# Patient Record
Sex: FEMALE | Race: OTHER | Employment: FULL TIME | URBAN - METROPOLITAN AREA
[De-identification: names, ages, dates, MRNs, and addresses within clinical notes are randomized per-mention and may not be internally consistent; named-entity substitution may affect disease eponyms.]

---

## 2019-05-27 ENCOUNTER — APPOINTMENT (OUTPATIENT)
Dept: ULTRASOUND IMAGING | Age: 29
End: 2019-05-27
Attending: PHYSICIAN ASSISTANT
Payer: SELF-PAY

## 2019-05-27 ENCOUNTER — HOSPITAL ENCOUNTER (EMERGENCY)
Age: 29
Discharge: HOME | End: 2019-05-27
Attending: EMERGENCY MEDICINE
Payer: SELF-PAY

## 2019-05-27 VITALS
DIASTOLIC BLOOD PRESSURE: 86 MMHG | HEART RATE: 73 BPM | TEMPERATURE: 97 F | OXYGEN SATURATION: 99 % | SYSTOLIC BLOOD PRESSURE: 125 MMHG | RESPIRATION RATE: 14 BRPM

## 2019-05-27 DIAGNOSIS — R10.2 PELVIC PAIN: Primary | ICD-10-CM

## 2019-05-27 DIAGNOSIS — N83.201 RIGHT OVARIAN CYST: ICD-10-CM

## 2019-05-27 LAB
ALBUMIN SERPL-MCNC: 3.8 G/DL (ref 3.4–5)
ALBUMIN/GLOB SERPL: 1 {RATIO} (ref 0.8–1.7)
ALP SERPL-CCNC: 85 U/L (ref 45–117)
ALT SERPL-CCNC: 29 U/L (ref 13–56)
ANION GAP SERPL CALC-SCNC: 9 MMOL/L (ref 3–18)
APPEARANCE UR: CLEAR
AST SERPL-CCNC: 20 U/L (ref 15–37)
BACTERIA URNS QL MICRO: NEGATIVE /HPF
BASOPHILS # BLD: 0 K/UL (ref 0–0.1)
BASOPHILS NFR BLD: 0 % (ref 0–2)
BILIRUB SERPL-MCNC: 0.3 MG/DL (ref 0.2–1)
BILIRUB UR QL: NEGATIVE
BUN SERPL-MCNC: 16 MG/DL (ref 7–18)
BUN/CREAT SERPL: 23 (ref 12–20)
CALCIUM SERPL-MCNC: 9.2 MG/DL (ref 8.5–10.1)
CHLORIDE SERPL-SCNC: 103 MMOL/L (ref 100–108)
CO2 SERPL-SCNC: 26 MMOL/L (ref 21–32)
COLOR UR: YELLOW
CREAT SERPL-MCNC: 0.71 MG/DL (ref 0.6–1.3)
DIFFERENTIAL METHOD BLD: ABNORMAL
EOSINOPHIL # BLD: 0.3 K/UL (ref 0–0.4)
EOSINOPHIL NFR BLD: 3 % (ref 0–5)
EPITH CASTS URNS QL MICRO: NORMAL /LPF (ref 0–5)
ERYTHROCYTE [DISTWIDTH] IN BLOOD BY AUTOMATED COUNT: 13.4 % (ref 11.6–14.5)
GLOBULIN SER CALC-MCNC: 3.9 G/DL (ref 2–4)
GLUCOSE SERPL-MCNC: 102 MG/DL (ref 74–99)
GLUCOSE UR STRIP.AUTO-MCNC: NEGATIVE MG/DL
HCG UR QL: NEGATIVE
HCT VFR BLD AUTO: 43.1 % (ref 35–45)
HGB BLD-MCNC: 13.4 G/DL (ref 12–16)
HGB UR QL STRIP: NEGATIVE
KETONES UR QL STRIP.AUTO: NEGATIVE MG/DL
LEUKOCYTE ESTERASE UR QL STRIP.AUTO: NEGATIVE
LIPASE SERPL-CCNC: 120 U/L (ref 73–393)
LYMPHOCYTES # BLD: 4 K/UL (ref 0.9–3.6)
LYMPHOCYTES NFR BLD: 35 % (ref 21–52)
MCH RBC QN AUTO: 26.6 PG (ref 24–34)
MCHC RBC AUTO-ENTMCNC: 31.1 G/DL (ref 31–37)
MCV RBC AUTO: 85.7 FL (ref 74–97)
MONOCYTES # BLD: 0.7 K/UL (ref 0.05–1.2)
MONOCYTES NFR BLD: 6 % (ref 3–10)
NEUTS SEG # BLD: 6.2 K/UL (ref 1.8–8)
NEUTS SEG NFR BLD: 56 % (ref 40–73)
NITRITE UR QL STRIP.AUTO: NEGATIVE
PH UR STRIP: 5.5 [PH] (ref 5–8)
PLATELET # BLD AUTO: 341 K/UL (ref 135–420)
PMV BLD AUTO: 9.8 FL (ref 9.2–11.8)
POTASSIUM SERPL-SCNC: 4.2 MMOL/L (ref 3.5–5.5)
PROT SERPL-MCNC: 7.7 G/DL (ref 6.4–8.2)
PROT UR STRIP-MCNC: 30 MG/DL
RBC # BLD AUTO: 5.03 M/UL (ref 4.2–5.3)
RBC #/AREA URNS HPF: NORMAL /HPF (ref 0–5)
SERVICE CMNT-IMP: NORMAL
SODIUM SERPL-SCNC: 138 MMOL/L (ref 136–145)
SP GR UR REFRACTOMETRY: 1.02 (ref 1–1.03)
UROBILINOGEN UR QL STRIP.AUTO: 0.2 EU/DL (ref 0.2–1)
WBC # BLD AUTO: 11.2 K/UL (ref 4.6–13.2)
WBC URNS QL MICRO: NORMAL /HPF (ref 0–4)
WET PREP GENITAL: NORMAL

## 2019-05-27 PROCEDURE — 87491 CHLMYD TRACH DNA AMP PROBE: CPT

## 2019-05-27 PROCEDURE — 87210 SMEAR WET MOUNT SALINE/INK: CPT

## 2019-05-27 PROCEDURE — 81001 URINALYSIS AUTO W/SCOPE: CPT

## 2019-05-27 PROCEDURE — 83690 ASSAY OF LIPASE: CPT

## 2019-05-27 PROCEDURE — 99283 EMERGENCY DEPT VISIT LOW MDM: CPT

## 2019-05-27 PROCEDURE — 85025 COMPLETE CBC W/AUTO DIFF WBC: CPT

## 2019-05-27 PROCEDURE — 74011250637 HC RX REV CODE- 250/637: Performed by: PHYSICIAN ASSISTANT

## 2019-05-27 PROCEDURE — 80053 COMPREHEN METABOLIC PANEL: CPT

## 2019-05-27 PROCEDURE — 76830 TRANSVAGINAL US NON-OB: CPT

## 2019-05-27 PROCEDURE — 81025 URINE PREGNANCY TEST: CPT

## 2019-05-27 RX ORDER — IBUPROFEN 600 MG/1
600 TABLET ORAL
Qty: 20 TAB | Refills: 0 | Status: SHIPPED | OUTPATIENT
Start: 2019-05-27

## 2019-05-27 RX ORDER — ACETAMINOPHEN 500 MG
1000 TABLET ORAL
Status: COMPLETED | OUTPATIENT
Start: 2019-05-27 | End: 2019-05-27

## 2019-05-27 RX ORDER — ACETAMINOPHEN 500 MG
500 TABLET ORAL
Qty: 20 TAB | Refills: 0 | Status: SHIPPED | OUTPATIENT
Start: 2019-05-27

## 2019-05-27 RX ADMIN — ACETAMINOPHEN 1000 MG: 500 TABLET, FILM COATED ORAL at 19:23

## 2019-05-27 NOTE — ED PROVIDER NOTES
EMERGENCY DEPARTMENT HISTORY AND PHYSICAL EXAM    Date: 5/27/2019  Patient Name: Yong Ortez    History of Presenting Illness     Chief Complaint   Patient presents with    Abdominal Pain         History Provided By: Patient      Additional History (Context): Yong Ortez is a 24-year-old Kinyarwanda-speaking female with no significant past medical history here with 10 days of left lower quadrant pain and left pelvic pain. No associated symptoms. Patient denies nausea, vomiting, fever, chills, constipation, diarrhea, vaginal discharge, vaginal bleeding or urinary symptoms. Patient states last period was the beginning of the month, denies chance of being pregnant. Patient states last bowel movement was yesterday. No recent travel or sick contacts. Up-to-date on vaccinations. Pt evaluated using computer      PCP: None        Past History     Past Medical History:  No past medical history on file. Past Surgical History:  No past surgical history on file. Family History:  No family history on file. Social History:  Social History     Tobacco Use    Smoking status: Not on file   Substance Use Topics    Alcohol use: Not on file    Drug use: Not on file       Allergies: Allergies not on file      Review of Systems     Review of Systems   Constitutional: Negative for chills and fever. HENT: Negative for nasal congestion, sore throat, rhinorrhea  Eyes: Negative. Respiratory: neg  cough and negative for shortness of breath. Cardiovascular: Negative for chest pain and palpitations. Gastrointestinal: Positive for abdominal pain, Negative for constipation, diarrhea, neg nausea and vomiting. Genitourinary: Negative. Negative for difficulty urinating and flank pain. Musculoskeletal: Negative for back pain. Negative for gait problem and neck pain. Skin: Negative. Allergic/Immunologic: Negative.     Neurological: Negative for dizziness, weakness, numbness and January 16, 2017    Ambrocio JACOBSEN Geoffrey  [address] [address]  [facility] [address]             [facility]  [address]  [facility] [address]  Phone: [phone] Dear Mr. Hale:    We have tried to reach you by mychart unsuccessfully.    [facility] is committed to your overall health.  To help you get the most out of each of your visits, we will review your information to make sure you are up to date on all of your recommended tests and/or procedures.       Dr. Kaylen Pavon has found that you may be due for colon cancer screening (stool cards).     If you have had any of the above done at another facility, please bring the records or information with you so that your record at [facility] will be complete.     If you are currently taking medication, please bring it with you to your appointment for review.     If you have any questions or concerns, please don't hesitate to call.    Thank you for letting us care for you,  Swetha Neil LPN Clinical Care Coordinator  [facility] [place] [facility] [place]  [phone]        headaches. Psychiatric/Behavioral: Negative. All other systems reviewed and are negative. All Other Systems Negative  Physical Exam     Vitals:    05/27/19 1432   BP: 123/80   Pulse: 76   Resp: 18   Temp: 97 °F (36.1 °C)   SpO2: 100%     Physical Exam   Constitutional: She is oriented to person, place, and time. She appears well-developed and well-nourished. No distress. HENT:   Head: Normocephalic and atraumatic. Nose: Nose normal.   Eyes: Pupils are equal, round, and reactive to light. Conjunctivae and EOM are normal.   Neck: Normal range of motion. Neck supple. Cardiovascular: Normal rate and regular rhythm. Pulmonary/Chest: Effort normal and breath sounds normal. No respiratory distress. Abdominal: Soft. She exhibits no distension. There is tenderness. There is no rebound and no guarding. Very mild left lower quadrant pain on palpation. No peritoneal signs, no guarding. rigidity    Genitourinary: Vagina normal. No vaginal discharge found. Musculoskeletal: Normal range of motion. Neurological: She is alert and oriented to person, place, and time. No cranial nerve deficit. Coordination normal.   Skin: Skin is warm. No rash noted. She is not diaphoretic. Psychiatric: She has a normal mood and affect. Her behavior is normal.   Nursing note and vitals reviewed. Diagnostic Study Results     Labs -     Recent Results (from the past 12 hour(s))   CBC WITH AUTOMATED DIFF    Collection Time: 05/27/19  1:25 PM   Result Value Ref Range    WBC 11.2 4.6 - 13.2 K/uL    RBC 5.03 4.20 - 5.30 M/uL    HGB 13.4 12.0 - 16.0 g/dL    HCT 43.1 35.0 - 45.0 %    MCV 85.7 74.0 - 97.0 FL    MCH 26.6 24.0 - 34.0 PG    MCHC 31.1 31.0 - 37.0 g/dL    RDW 13.4 11.6 - 14.5 %    PLATELET 365 969 - 905 K/uL    MPV 9.8 9.2 - 11.8 FL    NEUTROPHILS 56 40 - 73 %    LYMPHOCYTES 35 21 - 52 %    MONOCYTES 6 3 - 10 %    EOSINOPHILS 3 0 - 5 %    BASOPHILS 0 0 - 2 %    ABS. NEUTROPHILS 6.2 1.8 - 8.0 K/UL    ABS.  LYMPHOCYTES 4.0 (H) 0.9 - 3.6 K/UL    ABS. MONOCYTES 0.7 0.05 - 1.2 K/UL    ABS. EOSINOPHILS 0.3 0.0 - 0.4 K/UL    ABS. BASOPHILS 0.0 0.0 - 0.1 K/UL    DF AUTOMATED     METABOLIC PANEL, COMPREHENSIVE    Collection Time: 05/27/19  1:25 PM   Result Value Ref Range    Sodium 138 136 - 145 mmol/L    Potassium 4.2 3.5 - 5.5 mmol/L    Chloride 103 100 - 108 mmol/L    CO2 26 21 - 32 mmol/L    Anion gap 9 3.0 - 18 mmol/L    Glucose 102 (H) 74 - 99 mg/dL    BUN 16 7.0 - 18 MG/DL    Creatinine 0.71 0.6 - 1.3 MG/DL    BUN/Creatinine ratio 23 (H) 12 - 20      GFR est AA >60 >60 ml/min/1.73m2    GFR est non-AA >60 >60 ml/min/1.73m2    Calcium 9.2 8.5 - 10.1 MG/DL    Bilirubin, total 0.3 0.2 - 1.0 MG/DL    ALT (SGPT) 29 13 - 56 U/L    AST (SGOT) 20 15 - 37 U/L    Alk.  phosphatase 85 45 - 117 U/L    Protein, total 7.7 6.4 - 8.2 g/dL    Albumin 3.8 3.4 - 5.0 g/dL    Globulin 3.9 2.0 - 4.0 g/dL    A-G Ratio 1.0 0.8 - 1.7     LIPASE    Collection Time: 05/27/19  1:25 PM   Result Value Ref Range    Lipase 120 73 - 393 U/L   WET PREP    Collection Time: 05/27/19  1:25 PM   Result Value Ref Range    Special Requests: NO SPECIAL REQUESTS      Wet prep NO YEAST,TRICHOMONAS OR CLUE CELLS NOTED     URINALYSIS W/ RFLX MICROSCOPIC    Collection Time: 05/27/19  2:35 PM   Result Value Ref Range    Color YELLOW      Appearance CLEAR      Specific gravity 1.018 1.005 - 1.030      pH (UA) 5.5 5.0 - 8.0      Protein 30 (A) NEG mg/dL    Glucose NEGATIVE  NEG mg/dL    Ketone NEGATIVE  NEG mg/dL    Bilirubin NEGATIVE  NEG      Blood NEGATIVE  NEG      Urobilinogen 0.2 0.2 - 1.0 EU/dL    Nitrites NEGATIVE  NEG      Leukocyte Esterase NEGATIVE  NEG     HCG URINE, QL    Collection Time: 05/27/19  2:35 PM   Result Value Ref Range    HCG urine, QL NEGATIVE  NEG     URINE MICROSCOPIC ONLY    Collection Time: 05/27/19  2:35 PM   Result Value Ref Range    WBC 0 to 3 0 - 4 /hpf    RBC NONE 0 - 5 /hpf    Epithelial cells FEW 0 - 5 /lpf    Bacteria NEGATIVE  NEG /hpf Radiologic Studies -   US TRANSVAGINAL    (Results Pending)     CT Results  (Last 48 hours)    None        CXR Results  (Last 48 hours)    None            Medical Decision Making   I am the first provider for this patient. I reviewed the vital signs, available nursing notes, past medical history, past surgical history, family history and social history. Vital Signs-Reviewed the patient's vital signs. Records Reviewed: Nursing notes, old medical records and any previous labs, imaging, visits, consultations pertinent to patient care    Procedures:  Pelvic Exam  Date/Time: 5/27/2019 4:52 PM  Performed by: PA  Procedure duration:  5 minutes. Documented by:  self. As dictated by:  self  Exam assisted by:  ED nurse. Type of exam performed: speculum and bimanual.    External genitalia appearance: normal.    Vaginal exam:  normal.    Cervical exam:  normal.    Specimen(s) collected:  GC, chlamydia and vaginal culture. Bimanual exam:  left adenexal tenderness. Patient tolerance: Patient tolerated the procedure well with no immediate complications          Provider Notes (Medical Decision Making):     Pt presents ambulatory in NAD, well-hydrated, non-toxic in appearance, with normal vitals. Benign exam of abdomen with minimal LLQ/left suprapubic and no peritoneal signs. Unremarkable labs, urine, pelvic labs. Mild left adnexal tenderness on bimanual.  Repeat abdominal exam reveals soft and non tender abdomen. No new sx. US pending. Will monitor and reassess. 4:55 PM : Pt care transferred to [facility] ,ED provider. History of patient complaint(s), available diagnostic reports and current treatment plan has been discussed thoroughly. Bedside rounding on patient occured : no . Intended disposition of patient : Home, ADMIT, Transfer, Extended Care Facility, TBD, AMA and ELOPED  Pending diagnostics reports and/or labs (please list): US and pain reassessment.        MED RECONCILIATION:  No current facility-administered medications for this encounter. No current outpatient medications on file. Disposition:  home    DISCHARGE NOTE:     Pt has been reexamined. Patient has no new complaints, changes, or physical findings. Care plan outlined and precautions discussed. Discussed proper way to take medications. Discussed treatment plan, return precautions, symptomatic relief, and expected time to improvement. All questions answered. Patient is stable for discharge and outpatient management. Patient is ready to go home. Follow-up Information    None         There are no discharge medications for this patient. Diagnosis     Clinical Impression: No diagnosis found.

## 2019-05-27 NOTE — ED NOTES
I have reviewed discharge instructions with the patient. The patient verbalized understanding. Patient armband removed and shredded. 2 prescriptions given. Video interpretor utilized for pt care and discharge review.

## 2019-05-27 NOTE — ED NOTES
6:15 PM Assumed care from [place], [place]. Labs reviewed and no acute abnormalities noted. Still awaiting US results. Spoke with patient and updated her on labs and wait. She states she has slight headache, would like pill for such. Will order tylenol. Rony Hicks PA-C     7:18 PM US shows right ovarian cyst and good blood flow bilaterally. Pt updated on results and plan using . She confirms no associated sx including blood in stool. Repeat abd exam soft and nontender and not c/w acute or surg abd. Will discharge to home.  Rony Hicks PA-C

## 2019-05-27 NOTE — DISCHARGE INSTRUCTIONS
Motrin and Tylenol as needed for pain. Please follow up with gynecologist for further evaluation. Return to the ER for persistent or worsening symptoms, chest pain, shortness of breath, fevers, concerns about dehydration, if you are unable to follow up as instructed, and for any other concerns. Nida Dietz en el ovario: Instrucciones de cuidado - [ Functional Ovarian Cyst: Care Instructions ]  Instrucciones de cuidado    Un quiste funcional en el ovario es un saco que se forma en la superficie del ovario de la evan sai la ovulación. El saco contiene un óvulo en maduración. Por lo general, el saco desaparece después de que se libera el óvulo. Ronald si el óvulo no se libera o si el saco se delbert luego de [employer] [place], el saco puede llenarse de líquido y formar un quiste. Los quistes ováricos funcionales son diferentes a los crecimientos ováricos provocados por otros problemas, amanda el cáncer. La mayoría de los quistes ováricos funcionales no causan síntomas y desaparecen por sí mismos. Algunos causan dolor leve. Otros pueden causar dolor micha cuando se rompen o sangran. La atención de seguimiento es zulma parte clave de sim tratamiento y seguridad. Asegúrese de hacer y acudir a todas las citas, y llame a sim médico si está teniendo problemas. También es zulma buena idea saber los resultados de abiola exámenes y mantener zulma lista de los medicamentos que moris. ¿Cómo puede cuidarse en el hogar? · Utilice calor, por ejemplo, de zulma bolsa de [place], zulma almohadilla térmica ajustada a temperatura baja o un baño tibio para relajar los músculos tensos y [employer]. · [employer] analgésicos (medicamentos para el dolor) exactamente según las indicaciones. ? Si el médico le recetó un analgésico, tómelo según las indicaciones. ? Si no está tomando un analgésico recetado, pregúntele a sim médico si puede howard jamey de [employer]. · Evite el estreñimiento.  Asegúrese de howard suficiente cantidad de líquidos e incluya en sim Lucrezia Alexandrea frutas, verduras y [place]. El estreñimiento no causa quistes ováricos, flavio podría empeorar el dolor en la pelvis. ¿Cuándo debes pedir ayuda? [place] al 911 en cualquier momento que consideres que necesitas atención de [place]. Por ejemplo, llama si:    · Te desmayaste (perdiste el conocimiento).     · Tienes dolor repentino e intenso en el abdomen o la pelvis.    Llama a tu médico ahora mismo o busca atención médica inmediata si:    · Tienes un dolor nuevo en el abdomen o la pelvis, o tu dolor empeora.     · Tienes sangrado vaginal intenso. [place] significa que empapas las toallas sanitarias o los tampones que sueles usar cada hora, sai 2 o más horas.     · Te sientes mareada o aturdida, o sientes que te vas a desmayar.     · Tienes dolor o sangrado sai o después de las relaciones sexuales.    Presta especial atención a los cambios en tu ritesh y asegúrate de comunicarte con tu médico si:    · Tu dolor no te randa hacer las cosas que disfrutas.     · No mejoras amanda se esperaba. ¿Dónde puede encontrar más información en inglés? Felicia Ramon a http://albino-rosanne.info/. Alejandra Clark [id number] en la búsqueda para aprender más acerca de \"Quiste funcional en el ovario: Instrucciones de cuidado - [ Functional Ovarian Cyst: Care Instructions ]. \"  Revisado: 14 garcia, 2018  Versión del contenido: 11.9  © 4415-8222 [employer]. Las instrucciones de cuidado fueron adaptadas bajo licencia por [employer] (which disclaims liability or warranty for this information). Si usted tiene [place] afección médica o sobre estas instrucciones, siempre pregunte a sim profesional de ritesh. [employer] niega toda garantía o responsabilidad por sim uso de esta información.               Dolor pélvico: Instrucciones de cuidado - [ Pelvic Pain: Care Instructions ]  Instrucciones de cuidado    El dolor pélvico, o dolor en la parte baja del abdomen, puede tener muchas causas. Con frecuencia, el dolor pélvico no es grave y mejora en unos días. Si el dolor continúa o [place], es posible que necesite exámenes y [place]. Hable con sim médico sobre cualquier síntoma nuevo. Podrían ser señales de un problema grave. La atención de seguimiento es zulma parte clave de sim tratamiento y seguridad. Asegúrese de hacer y acudir a todas las citas, y llame a sim médico si está teniendo problemas. También es zulma buena idea saber los resultados de abiola exámenes y mantener zulma lista de los medicamentos que moris. ¿Cómo puede cuidarse en el hogar? · Descanse hasta que se sienta mejor. Acuéstese y ponga zulma almohada debajo de las rodillas para [address] piernas. · Ol[place] abundantes líquidos. Podría descubrir que si moris sorbos pequeños y frecuentes caen mejor al estómago que si meliza zulma gran cantidad a la vez. Evite las bebidas carbonatadas o que contengan cafeína, amanda las sodas, el té o el café. · Intente comer varias comidas pequeñas, en lugar de 2 o 3 abundantes. Coma alimentos suaves, amanda arroz, pan sarahi seco o galletas saladas, bananas (plátanos) y puré de [place]. Evite las comidas grasosas y condimentadas, otras frutas y el alcohol hasta 50 horas después de que desaparezcan los síntomas. · Lorimor un analgésico (medicamento para el dolor) de venta clemencia, amanda acetaminofén (Tylenol), ibuprofeno (Advil, Motrin) o naproxeno (Aleve). Susana y siga todas las instrucciones de la [place]. · No tome dos o más analgésicos al [employer], a menos que el médico se lo haya indicado. Muchos analgésicos contienen acetaminofén, es decir, Tylenol. El exceso de acetaminofén (Tylenol) puede ser dañino. · Puede colocarse zulma almohadilla térmica, un paño tibio o calor húmedo sobre el abdomen para aliviar el dolor. ¿Cuándo debes pedir ayuda?   Llama a tu médico ahora mismo o busca atención médica inmediata si:    · Vuelves a tener fiebre o tienes fiebre más low.     · Tu dolor empeora.     · Piensas que puedes estar Iris Kitten especial atención a los cambios en tu ritesh y asegúrate de comunicarte con tu médico si:    · Vomitas o tienes diarrea.     · No mejoras después de 2 días. ¿Dónde puede encontrar más información en inglés? Rafael Peoples a http://albino-rosanne.info/. Glorya Sacks [id number] en la búsqueda para aprender más acerca de \"Dolor pélvico: Instrucciones de cuidado - [ Pelvic Pain: Care Instructions ]. \"  Revisado: 14 garcia, 2018  Versión del contenido: 11.9  © 4109-4108 [employer]wise, Incorporated. Las instrucciones de cuidado fueron adaptadas bajo licencia por [employer] (which disclaims liability or warranty for this information). Si usted tiene [place] afección médica o sobre estas instrucciones, siempre pregunte a sim profesional de ritesh. [employer] niega toda garantía o responsabilidad por sim uso de esta información.

## 2019-05-28 LAB
C TRACH RRNA SPEC QL NAA+PROBE: NEGATIVE
N GONORRHOEA RRNA SPEC QL NAA+PROBE: NEGATIVE
SPECIMEN SOURCE: NORMAL